# Patient Record
Sex: FEMALE | Race: WHITE | NOT HISPANIC OR LATINO | Employment: UNEMPLOYED | URBAN - METROPOLITAN AREA
[De-identification: names, ages, dates, MRNs, and addresses within clinical notes are randomized per-mention and may not be internally consistent; named-entity substitution may affect disease eponyms.]

---

## 2023-10-12 ENCOUNTER — TRANSCRIBE ORDERS (OUTPATIENT)
Dept: OBSTETRICS AND GYNECOLOGY | Facility: HOSPITAL | Age: 23
End: 2023-10-12
Payer: COMMERCIAL

## 2023-10-12 DIAGNOSIS — Z34.90 PREGNANCY, UNSPECIFIED GESTATIONAL AGE: ICD-10-CM

## 2023-10-12 DIAGNOSIS — Z82.79 FAMILY HISTORY OF CONGENITAL HEART DEFECT: ICD-10-CM

## 2023-10-12 DIAGNOSIS — Z36.3 ANTENATAL SCREENING FOR MALFORMATION USING ULTRASONICS: Primary | ICD-10-CM

## 2023-11-14 ENCOUNTER — OFFICE VISIT (OUTPATIENT)
Dept: OBSTETRICS AND GYNECOLOGY | Facility: HOSPITAL | Age: 23
End: 2023-11-14
Payer: COMMERCIAL

## 2023-11-14 ENCOUNTER — HOSPITAL ENCOUNTER (OUTPATIENT)
Dept: WOMENS IMAGING | Facility: HOSPITAL | Age: 23
Discharge: HOME | End: 2023-11-14
Admitting: NURSE PRACTITIONER
Payer: COMMERCIAL

## 2023-11-14 VITALS
HEIGHT: 64 IN | BODY MASS INDEX: 31.1 KG/M2 | WEIGHT: 182.2 LBS | DIASTOLIC BLOOD PRESSURE: 70 MMHG | SYSTOLIC BLOOD PRESSURE: 118 MMHG

## 2023-11-14 DIAGNOSIS — Z36.3 ANTENATAL SCREENING FOR MALFORMATION USING ULTRASONICS: ICD-10-CM

## 2023-11-14 DIAGNOSIS — Z82.79 FAMILY HISTORY OF CONGENITAL HEART DEFECT: ICD-10-CM

## 2023-11-14 DIAGNOSIS — Z82.49 FAMILY HISTORY OF CARDIAC DISORDER: Primary | ICD-10-CM

## 2023-11-14 DIAGNOSIS — Z34.90 PREGNANCY, UNSPECIFIED GESTATIONAL AGE: ICD-10-CM

## 2023-11-14 PROCEDURE — 76811 OB US DETAILED SNGL FETUS: CPT

## 2023-11-14 RX ORDER — SERTRALINE HYDROCHLORIDE 100 MG/1
100 TABLET, FILM COATED ORAL DAILY
COMMUNITY
Start: 2023-11-09

## 2023-11-14 RX ORDER — OMEPRAZOLE 20 MG/1
1 CAPSULE, DELAYED RELEASE ORAL DAILY
COMMUNITY
Start: 2023-11-09

## 2023-11-14 RX ORDER — PRENATAL VIT/IRON FUM/FOLIC AC 27MG-0.8MG
1 TABLET ORAL DAILY
COMMUNITY
Start: 2023-10-24

## 2023-11-14 NOTE — ASSESSMENT & PLAN NOTE
Patient seen for consultation today secondary to father the baby with multiple cardiac abnormalities including hypertrophic cardiomyopathy, ICD placement, pulm pulmonary valve, and short QT syndrome.  Per patient report FOB is not involved in current prenatal care but states it was secondary to cocaine use.  Per patient no obvious congenital heart abnormality per report.  We discussed that this was likely secondary to drug use fetus likely would not be affected though given incomplete history will obtain fetal echo around 24 weeks of gestation.  No obvious malformation seen on ultrasound today.  We discussed importance of notifying pediatricians of potential family history and monitoring with potential pediatric cardiology consult if needed.

## 2023-11-14 NOTE — PROGRESS NOTES
"    Maternal/Fetal Medicine Consult Note   Date: 2023  Name: Rebecca Clinton    : 2000     MRN: [id number]     Referring Provider: NISHA Sharpe*    Chief Complaint  Anomaly scan, Fob with multiple cardiac anomalies     Subjective     History of Present Illness:  Rebecca Clinton is a 22 y.o.  19w0d who presents today for anatomy ultrasound secondary to father the baby with multiple cardiac anomalies.    Patient overall feels well.  Denies contractions, leaking of fluid, vaginal bleeding.  Father the baby with reported hypertrophic cardiomyopathy , short QT, hole in pulmonary valve and ICD in place.    JUANITA: Estimated Date of Delivery: 24     ROS:   Otherwise Noted in HPI    Past Medical History:   Diagnosis Date    Chlamydia     treated and retested negative    Gonorrhea affecting pregnancy     treated and retested negative      Past Surgical History:   Procedure Laterality Date    OTHER SURGICAL HISTORY Right     keloid removal - right ear in 6th grade      OB History          1    Para   0    Term   0       0    AB   0    Living   0         SAB   0    IAB   0    Ectopic   0    Molar   0    Multiple   0    Live Births   0          Obstetric Comments   Fob #1 - Pregnancy #1   Fob with multiple cardia issues                Current Outpatient Medications:     omeprazole (priLOSEC) 20 MG capsule, Take 1 capsule by mouth Daily., Disp: , Rfl:     Prenatal Vit-Fe Fumarate-FA (prenatal vitamin 27-0.8) 27-0.8 MG tablet tablet, Take 1 tablet by mouth Daily., Disp: , Rfl:     sertraline (ZOLOFT) 100 MG tablet, Take 1 tablet by mouth Daily., Disp: , Rfl:     Objective     Vital Signs  /70   Ht 161.3 cm (63.5\")   Wt 82.6 kg (182 lb 3.2 oz)   LMP 2023 (Approximate)   Estimated body mass index is 31.77 kg/m² as calculated from the following:    Height as of this encounter: 161.3 cm (63.5\").    Weight as of this encounter: 82.6 kg (182 lb 3.2 oz).    Ultrasound " Impression:   See Viewpoint     Assessment and Plan     Rebecca Clinton is a 22 y.o.  19w0d who presents today for  anatomy ultrasound secondary to father the baby with multiple cardiac anomalies.    Diagnoses and all orders for this visit:    1. Family history of cardiac disorder (Primary)  Assessment & Plan:  Patient seen for consultation today secondary to father the baby with multiple cardiac abnormalities including hypertrophic cardiomyopathy, ICD placement, pulm pulmonary valve, and short QT syndrome.  Per patient report FOB is not involved in current prenatal care but states it was secondary to cocaine use.  Per patient no obvious congenital heart abnormality per report.  We discussed that this was likely secondary to drug use fetus likely would not be affected though given incomplete history will obtain fetal echo around 24 weeks of gestation.  No obvious malformation seen on ultrasound today.  We discussed importance of notifying pediatricians of potential family history and monitoring with potential pediatric cardiology consult if needed.           Follow Up  Follow-up in 5 weeks for fetal echo    I spent 30 minutes caring for the patient on the day of service. This included: obtaining or reviewing a separately obtained medical history, reviewing patient records, performing a medically appropriate exam and/or evaluation, counseling or educating the patient/family/caregiver, ordering medications, labs, and/or procedures and documenting such in the medical record. This does not include time spent on review and interpretation of other tests such as fetal ultrasound or the performance of other procedures such as amniocentesis or CVS.      Stephen Landis MD, FACOG  Maternal Fetal Medicine, [facility] [place]

## 2023-11-14 NOTE — LETTER
2023       No Recipients    Patient: Rebecca Clinton   YOB: 2000   Date of Visit: 2023       Dear MAYA Sharpe,    Thank you for referring Rebecca Clinton to me for evaluation. Below is a copy of my consult note.    If you have questions, please do not hesitate to call me. I look forward to following Rebecca along with you.         Sincerely,        Stephen Landis MD        CC:   No Recipients    No complaints noted today, next f/u with [facility] on 23, NIPT neg - male   Fob with multiple cardiac problems         Maternal/Fetal Medicine Consult Note   Date: 2023  Name: Rebecca Clinton    : 2000     MRN: [id number]     Referring Provider: NISHA Sharpe*    Chief Complaint  Anomaly scan, Fob with multiple cardiac anomalies     Subjective     History of Present Illness:  Rebecca Clinton is a 22 y.o.  19w0d who presents today for anatomy ultrasound secondary to father the baby with multiple cardiac anomalies.    Patient overall feels well.  Denies contractions, leaking of fluid, vaginal bleeding.  Father the baby with reported hypertrophic cardiomyopathy , short QT, hole in pulmonary valve and ICD in place.    JUANITA: Estimated Date of Delivery: 24     ROS:   Otherwise Noted in HPI    Past Medical History:   Diagnosis Date   • Chlamydia     treated and retested negative   • Gonorrhea affecting pregnancy     treated and retested negative      Past Surgical History:   Procedure Laterality Date   • OTHER SURGICAL HISTORY Right     keloid removal - right ear in 6th grade      OB History          1    Para   0    Term   0       0    AB   0    Living   0         SAB   0    IAB   0    Ectopic   0    Molar   0    Multiple   0    Live Births   0          Obstetric Comments   Fob #1 - Pregnancy #1   Fob with multiple cardia issues                Current Outpatient Medications:   •  omeprazole (priLOSEC) 20 MG capsule, Take 1  "capsule by mouth Daily., Disp: , Rfl:   •  Prenatal Vit-Fe Fumarate-FA (prenatal vitamin 27-0.8) 27-0.8 MG tablet tablet, Take 1 tablet by mouth Daily., Disp: , Rfl:   •  sertraline (ZOLOFT) 100 MG tablet, Take 1 tablet by mouth Daily., Disp: , Rfl:     Objective     Vital Signs  /70   Ht 161.3 cm (63.5\")   Wt 82.6 kg (182 lb 3.2 oz)   LMP 2023 (Approximate)   Estimated body mass index is 31.77 kg/m² as calculated from the following:    Height as of this encounter: 161.3 cm (63.5\").    Weight as of this encounter: 82.6 kg (182 lb 3.2 oz).    Ultrasound Impression:   See Viewpoint     Assessment and Plan     Rebecca Clinton is a 22 y.o.  19w0d who presents today for  anatomy ultrasound secondary to father the baby with multiple cardiac anomalies.    Diagnoses and all orders for this visit:    1. Family history of cardiac disorder (Primary)  Assessment & Plan:  Patient seen for consultation today secondary to father the baby with multiple cardiac abnormalities including hypertrophic cardiomyopathy, ICD placement, pulm pulmonary valve, and short QT syndrome.  Per patient report FOB is not involved in current prenatal care but states it was secondary to cocaine use.  Per patient no obvious congenital heart abnormality per report.  We discussed that this was likely secondary to drug use fetus likely would not be affected though given incomplete history will obtain fetal echo around 24 weeks of gestation.  No obvious malformation seen on ultrasound today.  We discussed importance of notifying pediatricians of potential family history and monitoring with potential pediatric cardiology consult if needed.           Follow Up  Follow-up in 5 weeks for fetal echo    I spent 30 minutes caring for the patient on the day of service. This included: obtaining or reviewing a separately obtained medical history, reviewing patient records, performing a medically appropriate exam and/or evaluation, counseling or " educating the patient/family/caregiver, ordering medications, labs, and/or procedures and documenting such in the medical record. This does not include time spent on review and interpretation of other tests such as fetal ultrasound or the performance of other procedures such as amniocentesis or CVS.      Stephen Landis MD, FACOG  Maternal Fetal Medicine, [facility]

## 2023-11-14 NOTE — PROGRESS NOTES
No complaints noted today, next f/u with [facility] on 12/7/23, NIPT neg - male   Fob with multiple cardiac problems

## 2023-12-19 ENCOUNTER — HOSPITAL ENCOUNTER (OUTPATIENT)
Dept: WOMENS IMAGING | Facility: HOSPITAL | Age: 23
Discharge: HOME | End: 2023-12-19
Admitting: OBSTETRICS & GYNECOLOGY
Payer: COMMERCIAL

## 2023-12-19 ENCOUNTER — OFFICE VISIT (OUTPATIENT)
Dept: OBSTETRICS AND GYNECOLOGY | Facility: HOSPITAL | Age: 23
End: 2023-12-19
Payer: COMMERCIAL

## 2023-12-19 VITALS — BODY MASS INDEX: 31.56 KG/M2 | WEIGHT: 181 LBS | DIASTOLIC BLOOD PRESSURE: 81 MMHG | SYSTOLIC BLOOD PRESSURE: 119 MMHG

## 2023-12-19 DIAGNOSIS — Z82.49 FAMILY HISTORY OF CARDIAC DISORDER: ICD-10-CM

## 2023-12-19 DIAGNOSIS — Z34.90 PREGNANCY, UNSPECIFIED GESTATIONAL AGE: ICD-10-CM

## 2023-12-19 DIAGNOSIS — Z82.49 FAMILY HISTORY OF CARDIAC DISORDER: Primary | ICD-10-CM

## 2023-12-19 PROCEDURE — 93325 DOPPLER ECHO COLOR FLOW MAPG: CPT

## 2023-12-19 PROCEDURE — 76825 ECHO EXAM OF FETAL HEART: CPT

## 2023-12-19 PROCEDURE — 76816 OB US FOLLOW-UP PER FETUS: CPT

## 2023-12-19 NOTE — PROGRESS NOTES
"Documentation of the ultrasound findings, images, and interpretations will be available in the patient's Viewpoint report which is located in the imaging tab in chart review.    Maternal/Fetal Medicine Follow Up  Note     Name: Rebecca Clinton    : 2000     MRN: [id number]     Referring Provider: NISHA Sharpe*    Chief Complaint  FOB with CHD    Subjective     History of Present Illness:  Rebecca Clniton is a 23 y.o.  24w0d who presents today for his CHD    JUANITA: Estimated Date of Delivery: 24     ROS:   As noted in HPI.     Objective     Vital Signs  /81   Wt 82.1 kg (181 lb)   LMP 2023 (Approximate)   Estimated body mass index is 31.56 kg/m² as calculated from the following:    Height as of 23: 161.3 cm (63.5\").    Weight as of this encounter: 82.1 kg (181 lb).    Physical Exam    Ultrasound Impression:   See Viewpoint    Assessment and Plan     Rebecca Clinton is a 23 y.o.  24w0d who presents today for Hx CHD    Diagnoses and all orders for this visit:    1. Family history of cardiac disorder (Primary)  Assessment & Plan:  Returns today given history of the father the baby with congenital heart disease.  Father's child was born with pulmonary valve abnormality.  Additionally the father this child was born with that with short QT syndrome.  As result of those short QT syndrome the father of this baby has an implantable defibrillator.    Fetal echocardiogram pulse was performed today and the fetal heart appears structurally normal.  No abnormalities were seen in the fetal heart and fetal rhythm appeared normal.    As the short QT syndrome may be an inherited condition the pediatricians need to be informed of this.  Additionally we would recommend an EKG of the .      2. Pregnancy, unspecified gestational age         Follow Up  No follow-ups on file.    I spent 10 minutes caring for the patient on the day of service. This included: obtaining or reviewing a " separately obtained medical history, reviewing patient records, performing a medically appropriate exam and/or evaluation, counseling or educating the patient/family/caregiver, ordering medications, labs, and/or procedures and documenting such in the medical record. This does not include time spent on review and interpretation of other tests such as fetal ultrasound or the performance of other procedures such as amniocentesis or CVS.      Douglas A. Milligan, MD  Maternal Fetal Medicine, [facility]     2023

## 2023-12-19 NOTE — ASSESSMENT & PLAN NOTE
Returns today given history of the father the baby with congenital heart disease.  Father's child was born with pulmonary valve abnormality.  Additionally the father this child was born with that with short QT syndrome.  As result of those short QT syndrome the father of this baby has an implantable defibrillator.    Fetal echocardiogram pulse was performed today and the fetal heart appears structurally normal.  No abnormalities were seen in the fetal heart and fetal rhythm appeared normal.    As the short QT syndrome may be an inherited condition the pediatricians need to be informed of this.  Additionally we would recommend an EKG of the .